# Patient Record
(demographics unavailable — no encounter records)

---

## 2025-01-14 NOTE — PHYSICAL EXAM
[FreeTextEntry1] : Medical assistant present for duration of physical examination   General no acute distress, alert and oriented Psych calm, pleasant demeanor, responding appropriately to questions Nonlabored breathing Ambulating without assistance Skin normal color and pigment, no visible lesions or rashes    Anorectal Exam: Inspection no erythema, induration or fluctuance, no skin excoriation, no fissure, no external hemorrhoidal inflammation CHENTE nontender, no masses palpated, no blood on gloved finger   Procedure: Anoscopy   Pre procedure Diagnosis: anal papillae Post procedure Diagnosis: hypertrophied anal papillae Anesthesia: none Estimated blood loss: none Specimen: none Complications: none   Consent obtained. Anoscopy was performed by passing a lighted anoscope with lubricant jelly into the anal canal and the entire anal mucosal surface was inspected. Findings included no fissure, mild internal hemorrhoids, multiple anal papillae seen in anal canal with a hypertrophied papilla seen left posterior anal verge   Patient tolerated examination and procedure well.

## 2025-01-14 NOTE — ASSESSMENT
[FreeTextEntry1] : Exam findings and diagnosis were discussed at length with patient. Currently asymptomatic. Signs/symptoms to monitor for were reviewed with patient; if occur, discussed consideration for excision.  Otherwise continue to monitor area and continue bowel regimen, avoid diarrhea and constipation. All questions were answered, patient expressed understanding, and is agreeable to this plan.

## 2025-01-14 NOTE — HISTORY OF PRESENT ILLNESS
[FreeTextEntry1] : 31 yo F presents for initial evaluation.  Referred by: Dr. Gina Peoples Referral Reason: skin tag  PMH: IBS, BPD PSH: denies FH: denies CRC/ IBD Medications: lamotrigine Allergies: NKDA Social history: denies Last Colonoscopy: denies  Patient presents for initial evaluation. Was seen by Dr. Peoples in November 2024 and told she has an anal skin tag and referred for CRS for further evaluation.  She has a history of what she believes is hemorrhoids given painful BMs and intermittent rectal bleeding, though she has never been diagnosed with hemorrhoids. She does not have any current rectal pain, bleeding, itching, burning. She denies history of anal warts in past, no history of abnormal cervical Paps, no anal receptive sex.  She is not using any topical medications to anal area.  BH: 1-2x/ day, takes Metamucil daily, no stool softeners  Denies AC/ NSAID use